# Patient Record
Sex: MALE | Race: WHITE | NOT HISPANIC OR LATINO | Employment: OTHER | URBAN - METROPOLITAN AREA
[De-identification: names, ages, dates, MRNs, and addresses within clinical notes are randomized per-mention and may not be internally consistent; named-entity substitution may affect disease eponyms.]

---

## 2024-10-31 ENCOUNTER — APPOINTMENT (OUTPATIENT)
Dept: GENERAL RADIOLOGY | Facility: HOSPITAL | Age: 59
End: 2024-10-31
Payer: MEDICARE

## 2024-10-31 ENCOUNTER — HOSPITAL ENCOUNTER (EMERGENCY)
Facility: HOSPITAL | Age: 59
Discharge: HOME | End: 2024-10-31
Attending: EMERGENCY MEDICINE
Payer: MEDICARE

## 2024-10-31 ENCOUNTER — APPOINTMENT (OUTPATIENT)
Dept: CT IMAGING | Facility: HOSPITAL | Age: 59
End: 2024-10-31
Payer: MEDICARE

## 2024-10-31 VITALS
RESPIRATION RATE: 22 BRPM | BODY MASS INDEX: 35.7 KG/M2 | SYSTOLIC BLOOD PRESSURE: 151 MMHG | HEIGHT: 71 IN | WEIGHT: 255 LBS | OXYGEN SATURATION: 95 % | DIASTOLIC BLOOD PRESSURE: 105 MMHG | TEMPERATURE: 97.5 F | HEART RATE: 111 BPM

## 2024-10-31 DIAGNOSIS — S39.012A LUMBAR STRAIN, INITIAL ENCOUNTER: ICD-10-CM

## 2024-10-31 DIAGNOSIS — F10.11 HISTORY OF ALCOHOL ABUSE: ICD-10-CM

## 2024-10-31 DIAGNOSIS — S09.90XA INJURY OF HEAD, INITIAL ENCOUNTER: ICD-10-CM

## 2024-10-31 DIAGNOSIS — S42.032A CLOSED DISPLACED FRACTURE OF ACROMIAL END OF LEFT CLAVICLE, INITIAL ENCOUNTER: Primary | ICD-10-CM

## 2024-10-31 DIAGNOSIS — R00.0 TACHYCARDIA: ICD-10-CM

## 2024-10-31 PROCEDURE — 72131 CT LUMBAR SPINE W/O DYE: CPT

## 2024-10-31 PROCEDURE — 72125 CT NECK SPINE W/O DYE: CPT

## 2024-10-31 PROCEDURE — 71045 X-RAY EXAM CHEST 1 VIEW: CPT

## 2024-10-31 PROCEDURE — 73030 X-RAY EXAM OF SHOULDER: CPT

## 2024-10-31 PROCEDURE — 73060 X-RAY EXAM OF HUMERUS: CPT

## 2024-10-31 PROCEDURE — 70450 CT HEAD/BRAIN W/O DYE: CPT

## 2024-10-31 PROCEDURE — 99284 EMERGENCY DEPT VISIT MOD MDM: CPT

## 2024-10-31 PROCEDURE — 63710000001 ONDANSETRON ODT 4 MG TABLET DISPERSIBLE: Performed by: EMERGENCY MEDICINE

## 2024-10-31 RX ORDER — HYDROCODONE BITARTRATE AND ACETAMINOPHEN 5; 325 MG/1; MG/1
1 TABLET ORAL EVERY 6 HOURS PRN
Qty: 5 TABLET | Refills: 0 | Status: SHIPPED | OUTPATIENT
Start: 2024-10-31

## 2024-10-31 RX ORDER — ONDANSETRON 4 MG/1
8 TABLET, ORALLY DISINTEGRATING ORAL ONCE
Status: COMPLETED | OUTPATIENT
Start: 2024-10-31 | End: 2024-10-31

## 2024-10-31 RX ORDER — OXYCODONE AND ACETAMINOPHEN 10; 325 MG/1; MG/1
1 TABLET ORAL ONCE
Status: COMPLETED | OUTPATIENT
Start: 2024-10-31 | End: 2024-10-31

## 2024-10-31 RX ADMIN — OXYCODONE AND ACETAMINOPHEN 1 TABLET: 10; 325 TABLET ORAL at 17:48

## 2024-10-31 RX ADMIN — ONDANSETRON 8 MG: 4 TABLET, ORALLY DISINTEGRATING ORAL at 17:49

## 2024-10-31 NOTE — ED PROVIDER NOTES
[place]    EMERGENCY DEPARTMENT ENCOUNTER      Pt Name: Prakash Watts  MRN: [id number]  YOB: 1965  Date of evaluation: 10/31/2024  Provider: Sundar Barrios MD    CHIEF COMPLAINT       Chief Complaint   Patient presents with    Shoulder Pain         HISTORY OF PRESENT ILLNESS   Prakash Watts is a 58 y.o. male who presents to the emergency department after a fall that occurred last night. Patient reports that he tripped going up the stairs and hit his head and left shoulder. He is also complaining of some L chest wall pain and low back pain after the fall. He did not lose consciousness. He has no neck pain or other injury. He is not taking any anticoagulant meds. He does admit to EtOH use around the time of the fall.       Nursing notes were reviewed.    REVIEW OF SYSTEMS     ROS:  A chief complaint appropriate review of systems was completed and is negative except as noted in the HPI.      PAST MEDICAL HISTORY   No past medical history on file.      SURGICAL HISTORY     No past surgical history on file.      CURRENT MEDICATIONS     No current facility-administered medications for this encounter.    Current Outpatient Medications:     HYDROcodone-acetaminophen (NORCO) 5-325 MG per tablet, Take 1 tablet by mouth Every 6 (Six) Hours As Needed for Moderate Pain., Disp: 5 tablet, Rfl: 0    ALLERGIES     Bupropion, Morphine, Tramadol, Gabapentin, Haloperidol, and Sertraline    FAMILY HISTORY     No family history on file.       SOCIAL HISTORY       Social History     Socioeconomic History    Marital status:          PHYSICAL EXAM    (up to 7 for level 4, 8 or more for level 5)     Vitals:    10/31/24 1930 10/31/24 2000 10/31/24 2032 10/31/24 2100   BP: (!) 163/115 (!) 140/105 (!) 134/105 (!) 151/105   BP Location:       Patient Position:       Pulse:   112 111   Resp:       Temp:       TempSrc:       SpO2:    95%   Weight:       Height:           General: Awake, alert, no acute distress.  HEENT:  Conjunctivae normal.  Neck: Trachea midline. No midline neck tenderness.  Cardiac: Heart regular rate, rhythm, no murmurs, rubs, or gallops  Lungs: Lungs are clear to auscultation, there is no wheezing, rhonchi, or rales. There is no use of accessory muscles.  Chest wall: There is no tenderness to palpation over the chest wall or over ribs  Abdomen: Abdomen is soft, nontender, nondistended. There are no firm or pulsatile masses, no rebound rigidity or guarding.   Musculoskeletal: Moderate tenderness/bruising about the L shoulder w/o deformity. Distally, radial/ulnar pulse 1+/comparable to the opposite side. No other area of bone tenderness. Mild parspinal lumbar tenderness w/o midline tenderness.  Neuro: Alert and oriented x 4.  Dermatology: Skin is warm and dry  Psych: Mentation is grossly normal, cognition is grossly normal. Affect is appropriate.        DIAGNOSTIC RESULTS     EKG: All EKGs are interpreted by the Emergency Department Physician who either signs or Co-signs this chart in the absence of a cardiologist.    No orders to display         RADIOLOGY:   [x] Radiologist's Report Reviewed:  XR Chest 1 View   Final Result   Impression:   No acute process         Electronically Signed: Rashaun Oconnell MD     10/31/2024 7:57 PM EDT     Workstation ID: [id number]      CT Head Without Contrast   Final Result   Impression:   No acute intracranial finding.      No acute fracture or traumatic malalignment of the cervical or lumbar spine.            Electronically Signed: Davis Benítez     10/31/2024 7:50 PM EDT     Workstation ID: [id number]      CT Cervical Spine Without Contrast   Final Result   Impression:   No acute intracranial finding.      No acute fracture or traumatic malalignment of the cervical or lumbar spine.            Electronically Signed: Davis Benítez     10/31/2024 7:50 PM EDT     Workstation ID: [id number]      CT Lumbar Spine Without Contrast   Final Result   Impression:   No acute intracranial  finding.      No acute fracture or traumatic malalignment of the cervical or lumbar spine.            Electronically Signed: Davis Benítez     10/31/2024 7:50 PM EDT     Workstation ID: [id number]      XR Shoulder 2+ View Left   Final Result   Impression:   1.Mildly comminuted fracture of the distal clavicle with mild inferior displacement of the distal clavicle fracture fragment.   2.Soft tissue edema in the area of fracture.   3.Moderate glenohumeral and acromioclavicular joint degeneration.   4.Multiple chronic appearing left posterior-lateral rib fractures.            Electronically Signed: Franky Mcclellan     10/31/2024 6:35 PM EDT     Workstation ID: [id number]      XR Humerus Left   Final Result   1.Mildly comminuted fracture of the distal clavicle with mild inferior displacement of the distal clavicle fracture fragment.   2.Moderate degenerative changes at the acromioclavicular joint.   3.Metallic density projects over the inferior glenoid neck/lateral scapula. This is indeterminate may be postsurgical. Correlate with history.            Electronically Signed: Franky Mcclellan     10/31/2024 6:34 PM EDT     Workstation ID: [id number]          I ordered and independently reviewed the above noted radiographic studies.        LABS:    I have reviewed and interpreted all of the currently available lab results from this visit (if applicable):  No results found for this or any previous visit.     If labs were ordered, I independently reviewed the results and considered them in treating the patient.      EMERGENCY DEPARTMENT COURSE and DIFFERENTIAL DIAGNOSIS/MDM:   Vitals:  AS OF 11:44 EDT    BP - (!) 151/105  HR - 111  TEMP - 97.5 °F (36.4 °C) (Oral)  O2 SATS - 95%        Discussion below represents my analysis of pertinent findings related to patient's condition, differential diagnosis, treatment plan and final disposition.      Differential diagnosis:  The differential diagnosis associated with the patient's presentation  includes: ICH, concussion, proximal humerus fracture, clavicle fracture, rib fracture, pneumothorax      Independent interpretations (ECG/rhythm strip/X-ray/US/CT scan): I independently interpreted the pt head CT and CXR - there is no ICH and there is no PTX      Additional sources:  Discussed/obtained information from independent historians:   [] Spouse:   [] Parent:   [] Friend:   [] EMS:   [] Other:  External (non-ED) record review:   [] Inpatient record:   [] Office record:   [] Outpatient record:   [] Prior Outpatient labs:   [] Prior Outpatient radiology:   [] Primary Care record:   [] Outside ED record:   [x] Other: I reviewed ED note from 9/13 - patient was seen for EtOH intoxication, severe agitation and aggressive behavior.      Patient's care impacted by:   [] Diabetes   [] Hypertension   [] Coronary Artery Disease   [] Cancer   [x] Other: Hx of EtOH abuse    Care significantly affected by Social Determinants of Health (housing and economic circumstances, unemployment)    [x] Yes     [] No   If yes, Patient's care significantly limited by  Social Determinants of Health including:    [] Inadequate housing    [] Low income    [] Alcoholism and drug addiction in family    [] Problems related to primary support group    [] Unemployment    [] Problems related to employment    [x] Other Social Determinants of Health: History of alcohol abuse    I considered prescription management with:    [x] Pain medication: patient was given oxycodone in the ED and prescribed hydrocodone for home   [] Antiviral:   [] Antibiotic:   [] Other:      ED Course:    ED Course as of 11/01/24 1144   Thu Oct 31, 2024   2104 This patient presents with isolated left clavicle fracture.  No evidence of shoulder dislocation or proximal humerus fracture.  Remainder of traumatic imaging is negative.  Based on complete history physical examination, there is no evidence of additional traumatic injury.  Mild tachycardia present on arrival,  however based on history and exam, I do not feel there is any emergent etiology for this.  Patient does admit to drinking heavily over the past several days which may be contributory.  Will prescribe a short course of pain medication, placed him in a sling, and have him follow-up with orthopedics. [NS]      ED Course User Index  [NS] Sundar Barrios MD           I had a discussion with the patient/family regarding diagnosis, diagnostic results, treatment plan, and medications.  The patient/family indicated understanding of these instructions.  I spent adequate time at the bedside preceding discharge necessary to personally discuss the aftercare instructions, giving patient education, providing explanations of the results of our evaluations/findings, and my decision making to assure that the patient/family understand the plan of care.  Time was allotted to answer questions at that time and throughout the ED course.  Emphasis was placed on timely follow-up after discharge.  I also discussed the potential for the development of an acute emergent condition requiring further evaluation, admission, or even surgical intervention. I discussed that we found nothing during the visit today indicating the need for further workup, admission, or the presence of an unstable medical condition.  I encouraged the patient to return to the emergency department immediately for ANY concerns, worsening, new complaints, or if symptoms persist and unable to seek follow-up in a timely fashion.  The patient/family expressed understanding and agreement with this plan.  The patient will follow-up with their PCP in 1-2 days for reevaluation.           PROCEDURES:    FRACTURE CARE  Patient has been diagnosed with CLOSED fracture of the L distal clavicle. Manipulation WAS NOT required. Sling and swathe was applied by patient nurse and re-examination demonstrated patient was neurovascularly intact following application. Patient was given  prescription for pain medication for home and will follow up with orthopedic surgery in 5-7 days.          FINAL IMPRESSION      1. Closed displaced fracture of acromial end of left clavicle, initial encounter    2. Injury of head, initial encounter    3. Lumbar strain, initial encounter    4. History of alcohol abuse    5. Tachycardia          DISPOSITION/PLAN     ED Disposition       ED Disposition   Discharge    Condition   Stable    Comment   --                 Comment: Please note this report has been produced using speech recognition software.      Sundar Barrios MD  Attending Emergency Physician             Sundar Barrios MD  11/01/24 1142